# Patient Record
Sex: FEMALE | URBAN - METROPOLITAN AREA
[De-identification: names, ages, dates, MRNs, and addresses within clinical notes are randomized per-mention and may not be internally consistent; named-entity substitution may affect disease eponyms.]

---
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
## 2024-03-11 ENCOUNTER — HOSPITAL ENCOUNTER (OUTPATIENT)
Facility: MEDICAL CENTER | Age: 24
End: 2024-03-11
Attending: NURSE PRACTITIONER
Payer: COMMERCIAL

## 2024-03-11 ENCOUNTER — OFFICE VISIT (OUTPATIENT)
Dept: URGENT CARE | Facility: CLINIC | Age: 24
End: 2024-03-11
Payer: COMMERCIAL

## 2024-03-11 ENCOUNTER — APPOINTMENT (OUTPATIENT)
Dept: URGENT CARE | Facility: CLINIC | Age: 24
End: 2024-03-11

## 2024-03-11 VITALS
BODY MASS INDEX: 21.65 KG/M2 | RESPIRATION RATE: 14 BRPM | HEIGHT: 71 IN | DIASTOLIC BLOOD PRESSURE: 62 MMHG | HEART RATE: 60 BPM | SYSTOLIC BLOOD PRESSURE: 102 MMHG | TEMPERATURE: 97 F | OXYGEN SATURATION: 100 % | WEIGHT: 154.6 LBS

## 2024-03-11 DIAGNOSIS — J03.90 EXUDATIVE TONSILLITIS: ICD-10-CM

## 2024-03-11 LAB
HETEROPH AB SER QL LA: NEGATIVE
POCT INT CON NEG: NEGATIVE
POCT INT CON POS: POSITIVE
S PYO DNA SPEC NAA+PROBE: NOT DETECTED

## 2024-03-11 PROCEDURE — 99203 OFFICE O/P NEW LOW 30 MIN: CPT | Performed by: NURSE PRACTITIONER

## 2024-03-11 PROCEDURE — 87077 CULTURE AEROBIC IDENTIFY: CPT

## 2024-03-11 PROCEDURE — 87651 STREP A DNA AMP PROBE: CPT | Performed by: NURSE PRACTITIONER

## 2024-03-11 PROCEDURE — 87070 CULTURE OTHR SPECIMN AEROBIC: CPT

## 2024-03-11 PROCEDURE — 86308 HETEROPHILE ANTIBODY SCREEN: CPT | Performed by: NURSE PRACTITIONER

## 2024-03-11 PROCEDURE — 3078F DIAST BP <80 MM HG: CPT | Performed by: NURSE PRACTITIONER

## 2024-03-11 PROCEDURE — 3074F SYST BP LT 130 MM HG: CPT | Performed by: NURSE PRACTITIONER

## 2024-03-11 RX ORDER — SPIRONOLACTONE 25 MG/1
25 TABLET ORAL DAILY
COMMUNITY

## 2024-03-11 ASSESSMENT — ENCOUNTER SYMPTOMS
SORE THROAT: 0
EDEMA: 1
HEADACHES: 0
CHILLS: 0
FEVER: 0

## 2024-03-12 DIAGNOSIS — J03.90 EXUDATIVE TONSILLITIS: ICD-10-CM

## 2024-03-12 NOTE — PROGRESS NOTES
"Subjective:     Gracie Pedersen is a 23 y.o. female who presents for Edema (Tonsils, white spots on back of throat x 1 day)      Edema  Pertinent negatives include no chills, fever, headaches or sore throat.     Pt presents for evaluation of a new problem. Gracie is a pleasant 23 year old female who presents to  today with complaints of tonsillar exudate on her right tonsil. SHe notes mild fatigue but denies any throat pain, Fevers/Chills, cough, congestion or headache.  No known ill contacts.  No medication has been used for her symptoms.    Review of Systems   Constitutional:  Positive for malaise/fatigue. Negative for chills and fever.   HENT:  Positive for ear pain. Negative for sore throat.    Neurological:  Negative for headaches.       PMH: No past medical history on file.  ALLERGIES: No Known Allergies  SURGHX: No past surgical history on file.  SOCHX:   Social History     Socioeconomic History    Marital status: Unknown     FH: No family history on file.      Objective:   /62 (BP Location: Left arm, Patient Position: Sitting, BP Cuff Size: Adult long)   Pulse 60   Temp 36.1 °C (97 °F) (Temporal)   Resp 14   Ht 1.803 m (5' 11\")   Wt 70.1 kg (154 lb 9.6 oz)   LMP 03/06/2024   SpO2 100%   BMI 21.56 kg/m²     Physical Exam  Vitals and nursing note reviewed.   Constitutional:       General: She is not in acute distress.     Appearance: Normal appearance. She is normal weight. She is not ill-appearing or toxic-appearing.   HENT:      Head: Normocephalic.      Right Ear: Tympanic membrane and external ear normal.      Left Ear: Tympanic membrane, ear canal and external ear normal.      Nose: No congestion or rhinorrhea.      Mouth/Throat:      Mouth: Mucous membranes are moist.      Pharynx: Uvula midline. Oropharyngeal exudate and posterior oropharyngeal erythema present. No uvula swelling.      Tonsils: Tonsillar exudate present. No tonsillar abscesses. 3+ on the right. 1+ on the left.   Eyes:    "   General:         Right eye: No discharge.         Left eye: No discharge.      Pupils: Pupils are equal, round, and reactive to light.   Pulmonary:      Effort: Pulmonary effort is normal.   Abdominal:      General: Abdomen is flat.   Musculoskeletal:         General: Normal range of motion.      Cervical back: Normal range of motion and neck supple. Tenderness present.   Lymphadenopathy:      Cervical: Cervical adenopathy present.   Skin:     General: Skin is dry.   Neurological:      General: No focal deficit present.      Mental Status: She is alert and oriented to person, place, and time. Mental status is at baseline.   Psychiatric:         Mood and Affect: Mood normal.         Behavior: Behavior normal.         Thought Content: Thought content normal.         Judgment: Judgment normal.     Results for orders placed or performed in visit on 03/11/24   POCT Mononucleosis (mono)   Result Value Ref Range    Heterophile Screen Negative Negative, Invalid    Internal Control Positive Positive     Internal Control Negative Negative    POCT CEPHEID GROUP A STREP - PCR   Result Value Ref Range    POC Group A Strep, PCR Not Detected Not Detected, Invalid       Assessment/Plan:   Assessment    1. Exudative tonsillitis  POCT Mononucleosis (mono)    POCT CEPHEID GROUP A STREP - PCR    CULTURE THROAT        Differential diagnosis discussed with patient.  At this time strep a and mono was negative in clinic.  Throat culture sent for further evaluation of bacteria.  Patient currently is asymptomatic and no treatment was given in clinic today due to this.  We did discuss use of sore throat lozenges, warm salt water gargles, ibuprofen and Tylenol if pain develops.  She is in agreement with this plan of care.

## 2024-03-14 LAB
BACTERIA SPEC RESP CULT: NORMAL
SIGNIFICANT IND 70042: NORMAL
SITE SITE: NORMAL
SOURCE SOURCE: NORMAL